# Patient Record
Sex: MALE | Race: BLACK OR AFRICAN AMERICAN | Employment: FULL TIME | URBAN - METROPOLITAN AREA
[De-identification: names, ages, dates, MRNs, and addresses within clinical notes are randomized per-mention and may not be internally consistent; named-entity substitution may affect disease eponyms.]

---

## 2023-09-09 ENCOUNTER — HOSPITAL ENCOUNTER (EMERGENCY)
Age: 38
Discharge: HOME | End: 2023-09-09
Attending: EMERGENCY MEDICINE

## 2023-09-09 VITALS
BODY MASS INDEX: 35.79 KG/M2 | DIASTOLIC BLOOD PRESSURE: 101 MMHG | RESPIRATION RATE: 18 BRPM | TEMPERATURE: 98.4 F | OXYGEN SATURATION: 99 % | WEIGHT: 250 LBS | HEIGHT: 70 IN | HEART RATE: 87 BPM | SYSTOLIC BLOOD PRESSURE: 138 MMHG

## 2023-09-09 DIAGNOSIS — S61.217A LACERATION OF LEFT LITTLE FINGER WITHOUT FOREIGN BODY WITHOUT DAMAGE TO NAIL, INITIAL ENCOUNTER: Primary | ICD-10-CM

## 2023-09-09 PROCEDURE — 6360000002 HC RX W HCPCS: Performed by: STUDENT IN AN ORGANIZED HEALTH CARE EDUCATION/TRAINING PROGRAM

## 2023-09-09 PROCEDURE — 12001 RPR S/N/AX/GEN/TRNK 2.5CM/<: CPT

## 2023-09-09 PROCEDURE — 2500000003 HC RX 250 WO HCPCS: Performed by: STUDENT IN AN ORGANIZED HEALTH CARE EDUCATION/TRAINING PROGRAM

## 2023-09-09 PROCEDURE — 99284 EMERGENCY DEPT VISIT MOD MDM: CPT

## 2023-09-09 PROCEDURE — 90471 IMMUNIZATION ADMIN: CPT | Performed by: STUDENT IN AN ORGANIZED HEALTH CARE EDUCATION/TRAINING PROGRAM

## 2023-09-09 PROCEDURE — 90714 TD VACC NO PRESV 7 YRS+ IM: CPT | Performed by: STUDENT IN AN ORGANIZED HEALTH CARE EDUCATION/TRAINING PROGRAM

## 2023-09-09 RX ORDER — LIDOCAINE HYDROCHLORIDE AND EPINEPHRINE 10; 10 MG/ML; UG/ML
20 INJECTION, SOLUTION INFILTRATION; PERINEURAL
Status: COMPLETED | OUTPATIENT
Start: 2023-09-09 | End: 2023-09-09

## 2023-09-09 RX ORDER — CEPHALEXIN 500 MG/1
500 CAPSULE ORAL 4 TIMES DAILY
Qty: 28 CAPSULE | Refills: 0 | Status: SHIPPED | OUTPATIENT
Start: 2023-09-09 | End: 2023-09-16

## 2023-09-09 RX ADMIN — CLOSTRIDIUM TETANI TOXOID ANTIGEN (FORMALDEHYDE INACTIVATED) AND CORYNEBACTERIUM DIPHTHERIAE TOXOID ANTIGEN (FORMALDEHYDE INACTIVATED) 0.5 ML: 5; 2 INJECTION, SUSPENSION INTRAMUSCULAR at 12:58

## 2023-09-09 RX ADMIN — LIDOCAINE HYDROCHLORIDE,EPINEPHRINE BITARTRATE 20 ML: 10; .01 INJECTION, SOLUTION INFILTRATION; PERINEURAL at 12:11

## 2023-09-09 ASSESSMENT — PAIN SCALES - GENERAL
PAINLEVEL_OUTOF10: 8
PAINLEVEL_OUTOF10: 0

## 2023-09-09 ASSESSMENT — ENCOUNTER SYMPTOMS
VOMITING: 0
SHORTNESS OF BREATH: 0
TROUBLE SWALLOWING: 0
FACIAL SWELLING: 0
COUGH: 0
PHOTOPHOBIA: 0
ABDOMINAL PAIN: 0

## 2023-09-09 ASSESSMENT — PAIN - FUNCTIONAL ASSESSMENT
PAIN_FUNCTIONAL_ASSESSMENT: 0-10
PAIN_FUNCTIONAL_ASSESSMENT: 0-10

## 2023-09-09 ASSESSMENT — PAIN DESCRIPTION - LOCATION: LOCATION: FINGER (COMMENT WHICH ONE)

## 2023-09-09 ASSESSMENT — PAIN DESCRIPTION - ORIENTATION: ORIENTATION: LEFT

## 2023-09-09 NOTE — ED PROVIDER NOTES
Emergency Department Provider Note       PCP: No primary care provider on file. Age: 40 y.o. Sex: male     DISPOSITION Decision To Discharge 09/09/2023 01:23:41 PM       ICD-10-CM    1. Laceration of left little finger without foreign body without damage to nail, initial encounter  S61.217A           Medical Decision Making     Complexity of Problems Addressed:  1 acute problem    Data Reviewed and Analyzed:  I independently ordered and reviewed each unique test.         Discussion of management or test interpretation. In summary this is a 80-year-old patient who presented today with a simple laceration to the left pinky finger. Based on my evaluation I feel the patient is at low risk for alternative diagnosis such as open fracture, retained foreign body, infection, tendon involvement. Reasoning behind my decision making process is that the patient is grossly well-appearing on exam. Laceration was examined through full bloodless field without evidence of foreign body or tendon involvement. Given the mechanism of injury, my suspicion for fracture is low at this time. The patient does not have any loss of range of motion to suggest the tendon has been compromised. There is no spreading erythema, fluctuance, pus, drainage  to suggest active infection. Given the mechanism and contamination level I do feel antibiotics are warranted. The wound was anesthetized using nerve block. It was then copiously irrigated with saline and explored for foreign body. Wound was then repaired with sutures. Procedure outlined below. The wound was then covered in bacitracin and nonstick sterile dressing. Patient was instructed to follow up with urgent care or PCP in 2-3 days for wound recheck. Tetanus shot updated today. Discussed with patient the likelihood of scarring. I specifically counseled the patient on warning signs to return immediately for.   I specifically counseled the patient on signs of infection to be aware removed: no      Debridement:  None    Undermining:  None    Scar revision: no    Skin repair:     Repair method:  Sutures    Suture size:  4-0    Suture material:  Prolene    Suture technique:  Simple interrupted    Number of sutures:  2  Approximation:     Approximation:  Close  Repair type:     Repair type:  Simple  Post-procedure details:     Dressing:  Antibiotic ointment and non-adherent dressing    Procedure completion:  Tolerated well, no immediate complications      Orders Placed This Encounter   Procedures    LACERATION REPAIR        Medications given during this emergency department visit:  Medications   tetanus & diphtheria toxoids (adult) 5-2 LFU injection 0.5 mL (0.5 mLs IntraMUSCular Given 9/9/23 1258)   lidocaine-EPINEPHrine 1 %-1:[id number] injection 20 mL (20 mLs IntraDERmal Given by Other 9/9/23 1211)       Discharge Medication List as of 9/9/2023  1:00 PM        START taking these medications    Details   cephALEXin (KEFLEX) 500 MG capsule Take 1 capsule by mouth 4 times daily for 7 days, Disp-28 capsule, R-0Print              History reviewed. No pertinent past medical history. History reviewed. No pertinent surgical history. Social History     Socioeconomic History    Marital status: Single     Spouse name: None    Number of children: None    Years of education: None    Highest education level: None        Discharge Medication List as of 9/9/2023  1:00 PM           No results found for any visits on 09/09/23. No orders to display                     Voice dictation software was used during the making of this note. This software is not perfect and grammatical and other typographical errors may be present. This note has not been completely proofread for errors.      Paula Brush, [address] [place] [address]  09/09/23 1351

## 2023-09-09 NOTE — DISCHARGE INSTRUCTIONS
Please return in 10 days to have stitches removed. Watch for signs of infection such as redness, warmth, swelling, purulent drainage. If you notice any signs of infection, return immediately. Take the antibiotics for the full course to hopefully prevent infection. As we discussed, I did not find a life threatening cause of your symptoms today. However, THAT DOES NOT MEAN IT COULD NOT DEVELOP. If you develop ANY new or worsening symptoms, it is critical that you return for re-evaluation. This includes any symptoms that are concerning to you, especially symptoms such as redness, warmth, swelling, draining yellow drainage coming out of the wound, fevers. If you do not return for re-evaluation, you risk serious complications, including death.